# Patient Record
Sex: FEMALE | URBAN - METROPOLITAN AREA
[De-identification: names, ages, dates, MRNs, and addresses within clinical notes are randomized per-mention and may not be internally consistent; named-entity substitution may affect disease eponyms.]

---

## 2021-12-06 ENCOUNTER — APPOINTMENT (OUTPATIENT)
Dept: URBAN - METROPOLITAN AREA CLINIC 266 | Age: 41
Setting detail: DERMATOLOGY
End: 2021-12-06

## 2021-12-06 DIAGNOSIS — L63.8 OTHER ALOPECIA AREATA: ICD-10-CM

## 2021-12-06 PROCEDURE — OTHER OBSERVATION: OTHER

## 2021-12-06 PROCEDURE — OTHER COUNSELING: TOPICAL STEROIDS: OTHER

## 2021-12-06 PROCEDURE — 11901 INJECT SKIN LESIONS >7: CPT

## 2021-12-06 PROCEDURE — OTHER INTRALESIONAL KENALOG: OTHER

## 2021-12-06 PROCEDURE — OTHER ADDITIONAL NOTES: OTHER

## 2021-12-06 PROCEDURE — OTHER PRESCRIPTION: OTHER

## 2021-12-06 PROCEDURE — OTHER COUNSELING: OTHER

## 2021-12-06 RX ORDER — CLOBETASOL PROPIONATE 0.5 MG/G
CREAM TOPICAL BID
Qty: 30 | Refills: 3 | Status: ERX | COMMUNITY
Start: 2021-12-06

## 2021-12-06 ASSESSMENT — LOCATION DETAILED DESCRIPTION DERM
LOCATION DETAILED: MID-OCCIPITAL SCALP
LOCATION DETAILED: RIGHT POSTERIOR PARIETAL SCALP
LOCATION DETAILED: RIGHT SUPERIOR OCCIPITAL SCALP

## 2021-12-06 ASSESSMENT — LOCATION ZONE DERM: LOCATION ZONE: SCALP

## 2021-12-06 ASSESSMENT — LOCATION SIMPLE DESCRIPTION DERM: LOCATION SIMPLE: POSTERIOR SCALP

## 2021-12-06 NOTE — PROCEDURE: ADDITIONAL NOTES
Additional Notes: Explained that stress can induce this loss. Discussed use of OTC Allegra to help as well as a steroid regimen
Detail Level: Simple
Render Risk Assessment In Note?: no

## 2021-12-06 NOTE — PROCEDURE: OBSERVATION
Size Of Lesion In Cm (Optional): 1.5
Size Of Lesion In Cm (Optional): 1
Size Of Lesion In Cm (Optional): 3.2
Detail Level: Detailed
X Size Of Lesion In Cm (Optional): 1.7

## 2022-01-24 ENCOUNTER — APPOINTMENT (OUTPATIENT)
Dept: URBAN - METROPOLITAN AREA CLINIC 266 | Age: 42
Setting detail: DERMATOLOGY
End: 2022-01-27

## 2022-01-24 DIAGNOSIS — L63.8 OTHER ALOPECIA AREATA: ICD-10-CM

## 2022-01-24 PROCEDURE — OTHER INTRALESIONAL KENALOG: OTHER

## 2022-01-24 PROCEDURE — OTHER OBSERVATION: OTHER

## 2022-01-24 PROCEDURE — OTHER ADDITIONAL NOTES: OTHER

## 2022-01-24 PROCEDURE — OTHER COUNSELING: OTHER

## 2022-01-24 PROCEDURE — OTHER COUNSELING: TOPICAL STEROIDS: OTHER

## 2022-01-24 PROCEDURE — OTHER PRESCRIPTION MEDICATION MANAGEMENT: OTHER

## 2022-01-24 PROCEDURE — 11900 INJECT SKIN LESIONS </W 7: CPT

## 2022-01-24 ASSESSMENT — LOCATION ZONE DERM: LOCATION ZONE: SCALP

## 2022-01-24 ASSESSMENT — LOCATION DETAILED DESCRIPTION DERM
LOCATION DETAILED: MID-OCCIPITAL SCALP
LOCATION DETAILED: RIGHT SUPERIOR OCCIPITAL SCALP
LOCATION DETAILED: RIGHT POSTERIOR PARIETAL SCALP

## 2022-01-24 ASSESSMENT — LOCATION SIMPLE DESCRIPTION DERM: LOCATION SIMPLE: POSTERIOR SCALP

## 2022-01-24 NOTE — PROCEDURE: OBSERVATION
Detail Level: Detailed
X Size Of Lesion In Cm (Optional): 1
Morphology Per Location (Optional): Resolved on exam
Size Of Lesion In Cm (Optional): 1.5
Morphology Per Location (Optional): Resolved on exam today
Size Of Lesion In Cm (Optional): 3.2
X Size Of Lesion In Cm (Optional): 1.7

## 2022-01-24 NOTE — PROCEDURE: ADDITIONAL NOTES
Detail Level: Simple
Additional Notes: Explained that stress can induce this loss. Discussed use of OTC Allegra to help as well as a steroid regimen
Render Risk Assessment In Note?: no

## 2022-01-24 NOTE — PROCEDURE: PRESCRIPTION MEDICATION MANAGEMENT
Detail Level: Zone
Continue Regimen: Clobetasol scalp solution AA BID X2 weeks on, 2 weeks off
Render In Strict Bullet Format?: No

## 2022-01-24 NOTE — PROCEDURE: INTRALESIONAL KENALOG
Kenalog Preparation: Kenalog
Concentration Of Solution Injected (Mg/Ml): 5.0
Include Z78.9 (Other Specified Conditions Influencing Health Status) As An Associated Diagnosis?: No
X Size Of Lesion In Cm (Optional): 0
Treatment Number (Optional): 2
Administered By (Optional): Tameka Gross
Consent: The risks of atrophy were reviewed with the patient.
Total Volume Injected (Ccs- Only Use Numbers And Decimals): 0.1
Validate Note Data When Using Inventory: Yes
Detail Level: Detailed
Medical Necessity Clause: This procedure was medically necessary because the lesions that were treated were:

## 2022-02-28 ENCOUNTER — APPOINTMENT (OUTPATIENT)
Dept: URBAN - METROPOLITAN AREA CLINIC 266 | Age: 42
Setting detail: DERMATOLOGY
End: 2022-03-03

## 2022-02-28 DIAGNOSIS — L63.8 OTHER ALOPECIA AREATA: ICD-10-CM

## 2022-02-28 PROCEDURE — OTHER PRESCRIPTION MEDICATION MANAGEMENT: OTHER

## 2022-02-28 PROCEDURE — 99213 OFFICE O/P EST LOW 20 MIN: CPT

## 2022-02-28 PROCEDURE — OTHER COUNSELING: OTHER

## 2022-02-28 ASSESSMENT — LOCATION SIMPLE DESCRIPTION DERM: LOCATION SIMPLE: POSTERIOR SCALP

## 2022-02-28 ASSESSMENT — LOCATION DETAILED DESCRIPTION DERM: LOCATION DETAILED: RIGHT SUPERIOR OCCIPITAL SCALP

## 2022-02-28 ASSESSMENT — LOCATION ZONE DERM: LOCATION ZONE: SCALP

## 2022-02-28 NOTE — PROCEDURE: PRESCRIPTION MEDICATION MANAGEMENT
Continue Regimen: Clobetasol scalp solution AA BID X2 weeks then d/c
Render In Strict Bullet Format?: No
Detail Level: Zone
Plan: Counseled that this can recur with traumatic stressful experiences, RTC if that occurs and hairloss returns.